# Patient Record
Sex: FEMALE | Race: ASIAN | NOT HISPANIC OR LATINO | Employment: FULL TIME | URBAN - METROPOLITAN AREA
[De-identification: names, ages, dates, MRNs, and addresses within clinical notes are randomized per-mention and may not be internally consistent; named-entity substitution may affect disease eponyms.]

---

## 2024-07-23 ENCOUNTER — APPOINTMENT (OUTPATIENT)
Dept: LAB | Facility: HOSPITAL | Age: 39
End: 2024-07-23
Attending: OBSTETRICS & GYNECOLOGY
Payer: COMMERCIAL

## 2024-07-23 ENCOUNTER — HOSPITAL ENCOUNTER (OUTPATIENT)
Dept: RADIOLOGY | Facility: HOSPITAL | Age: 39
Discharge: HOME | End: 2024-07-23
Attending: OBSTETRICS & GYNECOLOGY
Payer: COMMERCIAL

## 2024-07-23 DIAGNOSIS — Z31.41 FERTILITY TESTING: ICD-10-CM

## 2024-07-23 LAB — HCG UR QL: NEGATIVE

## 2024-07-23 PROCEDURE — 58340 CATHETER FOR HYSTEROGRAPHY: CPT

## 2024-07-23 PROCEDURE — 81025 URINE PREGNANCY TEST: CPT

## 2024-09-14 ENCOUNTER — HEALTH MAINTENANCE LETTER (OUTPATIENT)
Age: 39
End: 2024-09-14

## 2024-10-10 ENCOUNTER — OFFICE VISIT (OUTPATIENT)
Dept: INTERNAL MEDICINE | Facility: CLINIC | Age: 39
End: 2024-10-10
Payer: COMMERCIAL

## 2024-10-10 VITALS
BODY MASS INDEX: 25.16 KG/M2 | WEIGHT: 136.7 LBS | HEIGHT: 62 IN | HEART RATE: 72 BPM | DIASTOLIC BLOOD PRESSURE: 60 MMHG | SYSTOLIC BLOOD PRESSURE: 100 MMHG | RESPIRATION RATE: 16 BRPM | OXYGEN SATURATION: 99 % | TEMPERATURE: 98 F

## 2024-10-10 DIAGNOSIS — E05.00 GRAVES DISEASE: ICD-10-CM

## 2024-10-10 DIAGNOSIS — D25.9 UTERINE LEIOMYOMA, UNSPECIFIED LOCATION: ICD-10-CM

## 2024-10-10 DIAGNOSIS — Z01.818 PREOPERATIVE EXAMINATION: Primary | ICD-10-CM

## 2024-10-10 LAB — HGB BLD-MCNC: 10.7 G/DL (ref 11.7–15.7)

## 2024-10-10 PROCEDURE — 36415 COLL VENOUS BLD VENIPUNCTURE: CPT | Performed by: NURSE PRACTITIONER

## 2024-10-10 PROCEDURE — 99204 OFFICE O/P NEW MOD 45 MIN: CPT | Performed by: NURSE PRACTITIONER

## 2024-10-10 PROCEDURE — G2211 COMPLEX E/M VISIT ADD ON: HCPCS | Performed by: NURSE PRACTITIONER

## 2024-10-10 PROCEDURE — 85018 HEMOGLOBIN: CPT | Performed by: NURSE PRACTITIONER

## 2024-10-10 RX ORDER — METHIMAZOLE 10 MG/1
10 TABLET ORAL DAILY
COMMUNITY
Start: 2024-04-11

## 2024-10-10 NOTE — PROGRESS NOTES
Preoperative Evaluation  [facility]  [address] [address]  Phone: [phone]  Fax: [phone]  Primary Provider: Physician No Ref-Primary  Pre-op Performing Provider: Quirino Mckeon CNP  Oct 10, 2024             10/10/2024   Surgical Information   What procedure is being done? abodminal myomectomy bilateral salpingectomy   Facility or Hospital where procedure/surgery will be performed: [facility]   Who is doing the procedure / surgery? Dr Richardson   Date of surgery / procedure: Oct 22   Time of surgery / procedure:  11am   Where do you plan to recover after surgery? at home with family        Fax number for surgical facility: Note does not need to be faxed, will be available electronically in Epic.    Assessment & Plan     The proposed surgical procedure is considered LOW risk.    Preoperative examination  No contraindications for planned procedure  - Hemoglobin; Future  - Hemoglobin    Uterine leiomyoma, unspecified location  Myomectomy planned for definitive treatment    Graves disease  Continues on methimazole, managed by endocrine. Of note, she had a TSH checked this past August at less than 0.10, denies palpitations, arrhythmias, T4 1.5    Patient Instructions   Hold all supplements, aspirin and NSAIDs for 7 days prior to surgery.     Follow your surgeon's direction on when to stop eating and drinking prior to surgery.    Your surgeon will be managing your pain after your surgery.      Remove all jewelry and metal piercings before your surgery.     Remove nail polish from fingers before surgery.    If you use a CPAP machine, bring this with you to surgery.              - No identified additional risk factors other than previously addressed         Recommendation  Approval given to proceed with proposed procedure, without further diagnostic evaluation.    Ledy Collins is a 39 year old, presenting for the following:  Pre-Op Exam           10/10/2024     9:37 AM   Additional Questions   Roomed by JOANNE ALVA   Accompanied by SELF     HPI related to upcoming procedure: As above        10/10/2024   Pre-Op Questionnaire   Have you ever had a heart attack or stroke? No   Have you ever had surgery on your heart or blood vessels, such as a stent placement, a coronary artery bypass, or surgery on an artery in your head, neck, heart, or legs? No   Do you have chest pain with activity? No   Do you have a history of heart failure? No   Do you currently have a cold, bronchitis or symptoms of other infection? No   Do you have a cough, shortness of breath, or wheezing? No   Do you or anyone in your family have previous history of blood clots? No   Do you or does anyone in your family have a serious bleeding problem such as prolonged bleeding following surgeries or cuts? No   Have you ever had problems with anemia or been told to take iron pills? (!) YES    Have you had any abnormal blood loss such as black, tarry or bloody stools, or abnormal vaginal bleeding? No   Have you ever had a blood transfusion? No   Are you willing to have a blood transfusion if it is medically needed before, during, or after your surgery? Yes   Have you or any of your relatives ever had problems with anesthesia? No   Do you have sleep apnea, excessive snoring or daytime drowsiness? No   Do you have any artifical heart valves or other implanted medical devices like a pacemaker, defibrillator, or continuous glucose monitor? No   Do you have artificial joints? No   Are you allergic to latex? No        Health Care Directive  Patient does not have a Health Care Directive or Living Will: Discussed advance care planning with patient; however, patient declined at this time.    Preoperative Review of    reviewed - no record of controlled substances prescribed.          There are no problems to display for this patient.     Past Medical History:   Diagnosis Date    NO ACTIVE PROBLEMS      Past  "Surgical History:   Procedure Laterality Date    DENTAL SURGERY  age 17    1 wisdom tooth     Current Outpatient Medications   Medication Sig Dispense Refill    methimazole (TAPAZOLE) 10 MG tablet       ibuprofen (MIDOL) 200 MG capsule Take 200 mg by mouth every 4 hours as needed.         Allergies   Allergen Reactions    No Known Drug Allergy         Social History     Tobacco Use    Smoking status: Never     Passive exposure: Never    Smokeless tobacco: Not on file   Substance Use Topics    Alcohol use: No       History   Drug Use No               Objective    /60 (BP Location: Right arm, Patient Position: Sitting, Cuff Size: Adult Regular)   Pulse 72   Temp 98  F (36.7  C)   Resp 16   Ht 1.563 m (5' 1.54\")   Wt 62 kg (136 lb 11.2 oz)   LMP 10/06/2024   SpO2 99%   BMI 25.38 kg/m     Estimated body mass index is 25.38 kg/m  as calculated from the following:    Height as of this encounter: 1.563 m (5' 1.54\").    Weight as of this encounter: 62 kg (136 lb 11.2 oz).  Physical Exam  GENERAL: alert and no distress  NECK: no adenopathy, no asymmetry, masses, or scars  RESP: lungs clear to auscultation - no rales, rhonchi or wheezes  CV: regular rate and rhythm, normal S1 S2, no S3 or S4, no murmur, click or rub, no peripheral edema  ABDOMEN: soft, nontender, no hepatosplenomegaly, no masses and bowel sounds normal  MS: no gross musculoskeletal defects noted, no edema    No results for input(s): \"HGB\", \"PLT\", \"INR\", \"NA\", \"POTASSIUM\", \"CR\", \"A1C\" in the last 8760 hours.     Diagnostics  Labs pending at this time.  Results will be reviewed when available.   No EKG required, no history of coronary heart disease, significant arrhythmia, peripheral arterial disease or other structural heart disease.    Revised Cardiac Risk Index (RCRI)  The patient has the following serious cardiovascular risks for perioperative complications:   - No serious cardiac risks = 0 points     RCRI Interpretation: 1 point: Class II " (low risk - 0.9% complication rate)         Signed Electronically by: Quirino Mckeon CNP  A copy of this evaluation report is provided to the requesting physician.

## 2024-10-10 NOTE — H&P (VIEW-ONLY)
Preoperative Evaluation  [facility]  [address] [address]  Phone: [phone]  Fax: [phone]  Primary Provider: Physician No Ref-Primary  Pre-op Performing Provider: Quirino Mckeon CNP  Oct 10, 2024             10/10/2024   Surgical Information   What procedure is being done? abodminal myomectomy bilateral salpingectomy   Facility or Hospital where procedure/surgery will be performed: [facility]   Who is doing the procedure / surgery? Dr Richardson   Date of surgery / procedure: Oct 22   Time of surgery / procedure:  11am   Where do you plan to recover after surgery? at home with family        Fax number for surgical facility: Note does not need to be faxed, will be available electronically in Epic.    Assessment & Plan     The proposed surgical procedure is considered LOW risk.    Preoperative examination  No contraindications for planned procedure  - Hemoglobin; Future  - Hemoglobin    Uterine leiomyoma, unspecified location  Myomectomy planned for definitive treatment    Graves disease  Continues on methimazole, managed by endocrine. Of note, she had a TSH checked this past August at less than 0.10, denies palpitations, arrhythmias, T4 1.5    Patient Instructions   Hold all supplements, aspirin and NSAIDs for 7 days prior to surgery.     Follow your surgeon's direction on when to stop eating and drinking prior to surgery.    Your surgeon will be managing your pain after your surgery.      Remove all jewelry and metal piercings before your surgery.     Remove nail polish from fingers before surgery.    If you use a CPAP machine, bring this with you to surgery.              - No identified additional risk factors other than previously addressed         Recommendation  Approval given to proceed with proposed procedure, without further diagnostic evaluation.    Ledy Collins is a 39 year old, presenting for the following:  Pre-Op Exam           10/10/2024     9:37 AM   Additional Questions   Roomed by JOANNE ALVA   Accompanied by SELF     HPI related to upcoming procedure: As above        10/10/2024   Pre-Op Questionnaire   Have you ever had a heart attack or stroke? No   Have you ever had surgery on your heart or blood vessels, such as a stent placement, a coronary artery bypass, or surgery on an artery in your head, neck, heart, or legs? No   Do you have chest pain with activity? No   Do you have a history of heart failure? No   Do you currently have a cold, bronchitis or symptoms of other infection? No   Do you have a cough, shortness of breath, or wheezing? No   Do you or anyone in your family have previous history of blood clots? No   Do you or does anyone in your family have a serious bleeding problem such as prolonged bleeding following surgeries or cuts? No   Have you ever had problems with anemia or been told to take iron pills? (!) YES    Have you had any abnormal blood loss such as black, tarry or bloody stools, or abnormal vaginal bleeding? No   Have you ever had a blood transfusion? No   Are you willing to have a blood transfusion if it is medically needed before, during, or after your surgery? Yes   Have you or any of your relatives ever had problems with anesthesia? No   Do you have sleep apnea, excessive snoring or daytime drowsiness? No   Do you have any artifical heart valves or other implanted medical devices like a pacemaker, defibrillator, or continuous glucose monitor? No   Do you have artificial joints? No   Are you allergic to latex? No        Health Care Directive  Patient does not have a Health Care Directive or Living Will: Discussed advance care planning with patient; however, patient declined at this time.    Preoperative Review of    reviewed - no record of controlled substances prescribed.          There are no problems to display for this patient.     Past Medical History:   Diagnosis Date    NO ACTIVE PROBLEMS      Past  "Surgical History:   Procedure Laterality Date    DENTAL SURGERY  age 17    1 wisdom tooth     Current Outpatient Medications   Medication Sig Dispense Refill    methimazole (TAPAZOLE) 10 MG tablet       ibuprofen (MIDOL) 200 MG capsule Take 200 mg by mouth every 4 hours as needed.         Allergies   Allergen Reactions    No Known Drug Allergy         Social History     Tobacco Use    Smoking status: Never     Passive exposure: Never    Smokeless tobacco: Not on file   Substance Use Topics    Alcohol use: No       History   Drug Use No               Objective    /60 (BP Location: Right arm, Patient Position: Sitting, Cuff Size: Adult Regular)   Pulse 72   Temp 98  F (36.7  C)   Resp 16   Ht 1.563 m (5' 1.54\")   Wt 62 kg (136 lb 11.2 oz)   LMP 10/06/2024   SpO2 99%   BMI 25.38 kg/m     Estimated body mass index is 25.38 kg/m  as calculated from the following:    Height as of this encounter: 1.563 m (5' 1.54\").    Weight as of this encounter: 62 kg (136 lb 11.2 oz).  Physical Exam  GENERAL: alert and no distress  NECK: no adenopathy, no asymmetry, masses, or scars  RESP: lungs clear to auscultation - no rales, rhonchi or wheezes  CV: regular rate and rhythm, normal S1 S2, no S3 or S4, no murmur, click or rub, no peripheral edema  ABDOMEN: soft, nontender, no hepatosplenomegaly, no masses and bowel sounds normal  MS: no gross musculoskeletal defects noted, no edema    No results for input(s): \"HGB\", \"PLT\", \"INR\", \"NA\", \"POTASSIUM\", \"CR\", \"A1C\" in the last 8760 hours.     Diagnostics  Labs pending at this time.  Results will be reviewed when available.   No EKG required, no history of coronary heart disease, significant arrhythmia, peripheral arterial disease or other structural heart disease.    Revised Cardiac Risk Index (RCRI)  The patient has the following serious cardiovascular risks for perioperative complications:   - No serious cardiac risks = 0 points     RCRI Interpretation: 1 point: Class II " (low risk - 0.9% complication rate)         Signed Electronically by: Quirino Mckeon CNP  A copy of this evaluation report is provided to the requesting physician.

## 2024-10-18 RX ORDER — OXYCODONE HYDROCHLORIDE 5 MG/1
5 TABLET ORAL EVERY 6 HOURS PRN
Status: ON HOLD | COMMUNITY
Start: 2024-10-09 | End: 2024-10-22

## 2024-10-22 ENCOUNTER — HOSPITAL ENCOUNTER (INPATIENT)
Facility: CLINIC | Age: 39
LOS: 1 days | Discharge: HOME | End: 2024-10-23
Attending: OBSTETRICS & GYNECOLOGY | Admitting: OBSTETRICS & GYNECOLOGY
Payer: COMMERCIAL

## 2024-10-22 ENCOUNTER — ANESTHESIA EVENT (OUTPATIENT)
Dept: SURGERY | Facility: CLINIC | Age: 39
End: 2024-10-22
Payer: COMMERCIAL

## 2024-10-22 ENCOUNTER — ANESTHESIA (OUTPATIENT)
Dept: SURGERY | Facility: CLINIC | Age: 39
End: 2024-10-22
Payer: COMMERCIAL

## 2024-10-22 PROBLEM — Z98.890 S/P MYOMECTOMY: Status: ACTIVE | Noted: 2024-10-22

## 2024-10-22 LAB
ABO/RH(D): NORMAL
ANTIBODY SCREEN: NEGATIVE
BASOPHILS # BLD AUTO: 0.1 10E3/UL (ref 0–0.2)
BASOPHILS NFR BLD AUTO: 1 %
EOSINOPHIL # BLD AUTO: 0.3 10E3/UL (ref 0–0.7)
EOSINOPHIL NFR BLD AUTO: 5 %
ERYTHROCYTE [DISTWIDTH] IN BLOOD BY AUTOMATED COUNT: 22.6 % (ref 10–15)
GLUCOSE BLDC GLUCOMTR-MCNC: 96 MG/DL (ref 70–99)
HCG UR QL: NEGATIVE
HCT VFR BLD AUTO: 36.4 % (ref 35–47)
HGB BLD-MCNC: 11.4 G/DL (ref 11.7–15.7)
IMM GRANULOCYTES # BLD: 0 10E3/UL
IMM GRANULOCYTES NFR BLD: 0 %
LYMPHOCYTES # BLD AUTO: 2.5 10E3/UL (ref 0.8–5.3)
LYMPHOCYTES NFR BLD AUTO: 35 %
MCH RBC QN AUTO: 23.1 PG (ref 26.5–33)
MCHC RBC AUTO-ENTMCNC: 31.3 G/DL (ref 31.5–36.5)
MCV RBC AUTO: 74 FL (ref 78–100)
MONOCYTES # BLD AUTO: 0.4 10E3/UL (ref 0–1.3)
MONOCYTES NFR BLD AUTO: 6 %
NEUTROPHILS # BLD AUTO: 3.8 10E3/UL (ref 1.6–8.3)
NEUTROPHILS NFR BLD AUTO: 54 %
NRBC # BLD AUTO: 0 10E3/UL
NRBC BLD AUTO-RTO: 0 /100
PLATELET # BLD AUTO: 298 10E3/UL (ref 150–450)
RBC # BLD AUTO: 4.93 10E6/UL (ref 3.8–5.2)
SPECIMEN EXPIRATION DATE: NORMAL
WBC # BLD AUTO: 7.1 10E3/UL (ref 4–11)

## 2024-10-22 PROCEDURE — 258N000003 HC RX IP 258 OP 636: Performed by: OBSTETRICS & GYNECOLOGY

## 2024-10-22 PROCEDURE — 370N000017 HC ANESTHESIA TECHNICAL FEE, PER MIN: Performed by: OBSTETRICS & GYNECOLOGY

## 2024-10-22 PROCEDURE — 258N000003 HC RX IP 258 OP 636: Performed by: NURSE ANESTHETIST, CERTIFIED REGISTERED

## 2024-10-22 PROCEDURE — 250N000011 HC RX IP 250 OP 636: Mod: JZ | Performed by: OBSTETRICS & GYNECOLOGY

## 2024-10-22 PROCEDURE — 250N000011 HC RX IP 250 OP 636: Performed by: STUDENT IN AN ORGANIZED HEALTH CARE EDUCATION/TRAINING PROGRAM

## 2024-10-22 PROCEDURE — 250N000011 HC RX IP 250 OP 636: Performed by: OBSTETRICS & GYNECOLOGY

## 2024-10-22 PROCEDURE — 0UT70ZZ RESECTION OF BILATERAL FALLOPIAN TUBES, OPEN APPROACH: ICD-10-PCS | Performed by: OBSTETRICS & GYNECOLOGY

## 2024-10-22 PROCEDURE — 360N000077 HC SURGERY LEVEL 4, PER MIN: Performed by: OBSTETRICS & GYNECOLOGY

## 2024-10-22 PROCEDURE — 710N000010 HC RECOVERY PHASE 1, LEVEL 2, PER MIN: Performed by: OBSTETRICS & GYNECOLOGY

## 2024-10-22 PROCEDURE — 85004 AUTOMATED DIFF WBC COUNT: CPT | Performed by: OBSTETRICS & GYNECOLOGY

## 2024-10-22 PROCEDURE — 250N000013 HC RX MED GY IP 250 OP 250 PS 637: Performed by: STUDENT IN AN ORGANIZED HEALTH CARE EDUCATION/TRAINING PROGRAM

## 2024-10-22 PROCEDURE — 81025 URINE PREGNANCY TEST: CPT | Performed by: OBSTETRICS & GYNECOLOGY

## 2024-10-22 PROCEDURE — C9290 INJ, BUPIVACAINE LIPOSOME: HCPCS | Performed by: STUDENT IN AN ORGANIZED HEALTH CARE EDUCATION/TRAINING PROGRAM

## 2024-10-22 PROCEDURE — 120N000001 HC R&B MED SURG/OB

## 2024-10-22 PROCEDURE — 0UB90ZZ EXCISION OF UTERUS, OPEN APPROACH: ICD-10-PCS | Performed by: OBSTETRICS & GYNECOLOGY

## 2024-10-22 PROCEDURE — 250N000009 HC RX 250: Performed by: OBSTETRICS & GYNECOLOGY

## 2024-10-22 PROCEDURE — 272N000001 HC OR GENERAL SUPPLY STERILE: Performed by: OBSTETRICS & GYNECOLOGY

## 2024-10-22 PROCEDURE — 36415 COLL VENOUS BLD VENIPUNCTURE: CPT | Performed by: OBSTETRICS & GYNECOLOGY

## 2024-10-22 PROCEDURE — 88305 TISSUE EXAM BY PATHOLOGIST: CPT | Mod: TC | Performed by: OBSTETRICS & GYNECOLOGY

## 2024-10-22 PROCEDURE — 86901 BLOOD TYPING SEROLOGIC RH(D): CPT | Performed by: OBSTETRICS & GYNECOLOGY

## 2024-10-22 PROCEDURE — 88305 TISSUE EXAM BY PATHOLOGIST: CPT | Mod: 26 | Performed by: PATHOLOGY

## 2024-10-22 PROCEDURE — 250N000011 HC RX IP 250 OP 636: Performed by: NURSE ANESTHETIST, CERTIFIED REGISTERED

## 2024-10-22 PROCEDURE — 999N000141 HC STATISTIC PRE-PROCEDURE NURSING ASSESSMENT: Performed by: OBSTETRICS & GYNECOLOGY

## 2024-10-22 PROCEDURE — 250N000009 HC RX 250: Performed by: NURSE ANESTHETIST, CERTIFIED REGISTERED

## 2024-10-22 PROCEDURE — 86900 BLOOD TYPING SEROLOGIC ABO: CPT | Performed by: OBSTETRICS & GYNECOLOGY

## 2024-10-22 RX ORDER — TRANEXAMIC ACID 10 MG/ML
1 INJECTION, SOLUTION INTRAVENOUS ONCE
Status: COMPLETED | OUTPATIENT
Start: 2024-10-22 | End: 2024-10-22

## 2024-10-22 RX ORDER — SODIUM PHOSPHATE,MONO-DIBASIC 19G-7G/118
1 ENEMA (ML) RECTAL
Status: DISCONTINUED | OUTPATIENT
Start: 2024-10-22 | End: 2024-10-23 | Stop reason: HOSPADM

## 2024-10-22 RX ORDER — ACETAMINOPHEN 325 MG/1
975 TABLET ORAL ONCE
Status: COMPLETED | OUTPATIENT
Start: 2024-10-22 | End: 2024-10-22

## 2024-10-22 RX ORDER — PROCHLORPERAZINE MALEATE 10 MG
10 TABLET ORAL EVERY 6 HOURS PRN
Status: DISCONTINUED | OUTPATIENT
Start: 2024-10-22 | End: 2024-10-23 | Stop reason: HOSPADM

## 2024-10-22 RX ORDER — PROPOFOL 10 MG/ML
INJECTION, EMULSION INTRAVENOUS PRN
Status: DISCONTINUED | OUTPATIENT
Start: 2024-10-22 | End: 2024-10-22

## 2024-10-22 RX ORDER — IBUPROFEN 800 MG/1
800 TABLET, FILM COATED ORAL EVERY 6 HOURS
Status: DISCONTINUED | OUTPATIENT
Start: 2024-10-22 | End: 2024-10-23 | Stop reason: HOSPADM

## 2024-10-22 RX ORDER — KETOROLAC TROMETHAMINE 15 MG/ML
15 INJECTION, SOLUTION INTRAMUSCULAR; INTRAVENOUS EVERY 6 HOURS
Status: DISCONTINUED | OUTPATIENT
Start: 2024-10-22 | End: 2024-10-23 | Stop reason: HOSPADM

## 2024-10-22 RX ORDER — KETOROLAC TROMETHAMINE 30 MG/ML
INJECTION, SOLUTION INTRAMUSCULAR; INTRAVENOUS PRN
Status: DISCONTINUED | OUTPATIENT
Start: 2024-10-22 | End: 2024-10-22

## 2024-10-22 RX ORDER — HYDROXYZINE HYDROCHLORIDE 25 MG/1
25 TABLET, FILM COATED ORAL EVERY 6 HOURS PRN
Status: DISCONTINUED | OUTPATIENT
Start: 2024-10-22 | End: 2024-10-23 | Stop reason: HOSPADM

## 2024-10-22 RX ORDER — HYDROMORPHONE HCL IN WATER/PF 6 MG/30 ML
0.2 PATIENT CONTROLLED ANALGESIA SYRINGE INTRAVENOUS
Status: DISCONTINUED | OUTPATIENT
Start: 2024-10-22 | End: 2024-10-23 | Stop reason: HOSPADM

## 2024-10-22 RX ORDER — LIDOCAINE 40 MG/G
CREAM TOPICAL
Status: DISCONTINUED | OUTPATIENT
Start: 2024-10-22 | End: 2024-10-23 | Stop reason: HOSPADM

## 2024-10-22 RX ORDER — FENTANYL CITRATE 50 UG/ML
100 INJECTION, SOLUTION INTRAMUSCULAR; INTRAVENOUS ONCE
Status: DISCONTINUED | OUTPATIENT
Start: 2024-10-22 | End: 2024-10-22 | Stop reason: HOSPADM

## 2024-10-22 RX ORDER — AMOXICILLIN 250 MG
1 CAPSULE ORAL 2 TIMES DAILY
Status: DISCONTINUED | OUTPATIENT
Start: 2024-10-22 | End: 2024-10-23 | Stop reason: HOSPADM

## 2024-10-22 RX ORDER — ONDANSETRON 4 MG/1
4 TABLET, ORALLY DISINTEGRATING ORAL EVERY 6 HOURS PRN
Status: DISCONTINUED | OUTPATIENT
Start: 2024-10-22 | End: 2024-10-23 | Stop reason: HOSPADM

## 2024-10-22 RX ORDER — FENTANYL CITRATE 50 UG/ML
50 INJECTION, SOLUTION INTRAMUSCULAR; INTRAVENOUS EVERY 5 MIN PRN
Status: DISCONTINUED | OUTPATIENT
Start: 2024-10-22 | End: 2024-10-22 | Stop reason: HOSPADM

## 2024-10-22 RX ORDER — METHIMAZOLE 5 MG/1
10 TABLET ORAL DAILY
Status: DISCONTINUED | OUTPATIENT
Start: 2024-10-23 | End: 2024-10-23 | Stop reason: HOSPADM

## 2024-10-22 RX ORDER — NALOXONE HYDROCHLORIDE 0.4 MG/ML
0.4 INJECTION, SOLUTION INTRAMUSCULAR; INTRAVENOUS; SUBCUTANEOUS
Status: DISCONTINUED | OUTPATIENT
Start: 2024-10-22 | End: 2024-10-23 | Stop reason: HOSPADM

## 2024-10-22 RX ORDER — SODIUM CHLORIDE, SODIUM LACTATE, POTASSIUM CHLORIDE, CALCIUM CHLORIDE 600; 310; 30; 20 MG/100ML; MG/100ML; MG/100ML; MG/100ML
INJECTION, SOLUTION INTRAVENOUS CONTINUOUS PRN
Status: DISCONTINUED | OUTPATIENT
Start: 2024-10-22 | End: 2024-10-22

## 2024-10-22 RX ORDER — OXYCODONE HYDROCHLORIDE 5 MG/1
5 TABLET ORAL EVERY 4 HOURS PRN
Status: DISCONTINUED | OUTPATIENT
Start: 2024-10-22 | End: 2024-10-23 | Stop reason: HOSPADM

## 2024-10-22 RX ORDER — HYDROMORPHONE HCL IN WATER/PF 6 MG/30 ML
0.4 PATIENT CONTROLLED ANALGESIA SYRINGE INTRAVENOUS
Status: DISCONTINUED | OUTPATIENT
Start: 2024-10-22 | End: 2024-10-23 | Stop reason: HOSPADM

## 2024-10-22 RX ORDER — MAGNESIUM HYDROXIDE 1200 MG/15ML
LIQUID ORAL PRN
Status: DISCONTINUED | OUTPATIENT
Start: 2024-10-22 | End: 2024-10-22 | Stop reason: HOSPADM

## 2024-10-22 RX ORDER — DEXAMETHASONE SODIUM PHOSPHATE 4 MG/ML
4 INJECTION, SOLUTION INTRA-ARTICULAR; INTRALESIONAL; INTRAMUSCULAR; INTRAVENOUS; SOFT TISSUE
Status: DISCONTINUED | OUTPATIENT
Start: 2024-10-22 | End: 2024-10-22 | Stop reason: HOSPADM

## 2024-10-22 RX ORDER — ONDANSETRON 4 MG/1
4 TABLET, ORALLY DISINTEGRATING ORAL EVERY 30 MIN PRN
Status: DISCONTINUED | OUTPATIENT
Start: 2024-10-22 | End: 2024-10-22 | Stop reason: HOSPADM

## 2024-10-22 RX ORDER — HYDROMORPHONE HCL IN WATER/PF 6 MG/30 ML
0.4 PATIENT CONTROLLED ANALGESIA SYRINGE INTRAVENOUS EVERY 5 MIN PRN
Status: DISCONTINUED | OUTPATIENT
Start: 2024-10-22 | End: 2024-10-22 | Stop reason: HOSPADM

## 2024-10-22 RX ORDER — ONDANSETRON 2 MG/ML
4 INJECTION INTRAMUSCULAR; INTRAVENOUS EVERY 30 MIN PRN
Status: DISCONTINUED | OUTPATIENT
Start: 2024-10-22 | End: 2024-10-22 | Stop reason: HOSPADM

## 2024-10-22 RX ORDER — LIDOCAINE 40 MG/G
CREAM TOPICAL
Status: DISCONTINUED | OUTPATIENT
Start: 2024-10-22 | End: 2024-10-22 | Stop reason: HOSPADM

## 2024-10-22 RX ORDER — ONDANSETRON 2 MG/ML
4 INJECTION INTRAMUSCULAR; INTRAVENOUS EVERY 6 HOURS PRN
Status: DISCONTINUED | OUTPATIENT
Start: 2024-10-22 | End: 2024-10-23 | Stop reason: HOSPADM

## 2024-10-22 RX ORDER — LORAZEPAM 2 MG/ML
.5-1 INJECTION INTRAMUSCULAR
Status: DISCONTINUED | OUTPATIENT
Start: 2024-10-22 | End: 2024-10-22 | Stop reason: HOSPADM

## 2024-10-22 RX ORDER — ONDANSETRON 2 MG/ML
INJECTION INTRAMUSCULAR; INTRAVENOUS PRN
Status: DISCONTINUED | OUTPATIENT
Start: 2024-10-22 | End: 2024-10-22

## 2024-10-22 RX ORDER — CEFAZOLIN SODIUM/WATER 2 G/20 ML
2 SYRINGE (ML) INTRAVENOUS SEE ADMIN INSTRUCTIONS
Status: DISCONTINUED | OUTPATIENT
Start: 2024-10-22 | End: 2024-10-22 | Stop reason: HOSPADM

## 2024-10-22 RX ORDER — SODIUM CHLORIDE, SODIUM LACTATE, POTASSIUM CHLORIDE, CALCIUM CHLORIDE 600; 310; 30; 20 MG/100ML; MG/100ML; MG/100ML; MG/100ML
INJECTION, SOLUTION INTRAVENOUS CONTINUOUS
Status: DISCONTINUED | OUTPATIENT
Start: 2024-10-22 | End: 2024-10-23 | Stop reason: HOSPADM

## 2024-10-22 RX ORDER — FENTANYL CITRATE 50 UG/ML
25 INJECTION, SOLUTION INTRAMUSCULAR; INTRAVENOUS EVERY 5 MIN PRN
Status: DISCONTINUED | OUTPATIENT
Start: 2024-10-22 | End: 2024-10-22 | Stop reason: HOSPADM

## 2024-10-22 RX ORDER — LIDOCAINE HYDROCHLORIDE 10 MG/ML
INJECTION, SOLUTION INFILTRATION; PERINEURAL PRN
Status: DISCONTINUED | OUTPATIENT
Start: 2024-10-22 | End: 2024-10-22

## 2024-10-22 RX ORDER — BISACODYL 10 MG
10 SUPPOSITORY, RECTAL RECTAL DAILY PRN
Status: DISCONTINUED | OUTPATIENT
Start: 2024-10-22 | End: 2024-10-23 | Stop reason: HOSPADM

## 2024-10-22 RX ORDER — POLYETHYLENE GLYCOL 3350 17 G/17G
17 POWDER, FOR SOLUTION ORAL DAILY PRN
Status: DISCONTINUED | OUTPATIENT
Start: 2024-10-23 | End: 2024-10-23 | Stop reason: HOSPADM

## 2024-10-22 RX ORDER — VASOPRESSIN 20 U/ML
INJECTION PARENTERAL PRN
Status: DISCONTINUED | OUTPATIENT
Start: 2024-10-22 | End: 2024-10-22 | Stop reason: HOSPADM

## 2024-10-22 RX ORDER — ACETAMINOPHEN 325 MG/1
650 TABLET ORAL EVERY 6 HOURS
Status: DISCONTINUED | OUTPATIENT
Start: 2024-10-25 | End: 2024-10-23 | Stop reason: HOSPADM

## 2024-10-22 RX ORDER — PROPOFOL 10 MG/ML
INJECTION, EMULSION INTRAVENOUS CONTINUOUS PRN
Status: DISCONTINUED | OUTPATIENT
Start: 2024-10-22 | End: 2024-10-22

## 2024-10-22 RX ORDER — NALOXONE HYDROCHLORIDE 0.4 MG/ML
0.2 INJECTION, SOLUTION INTRAMUSCULAR; INTRAVENOUS; SUBCUTANEOUS
Status: DISCONTINUED | OUTPATIENT
Start: 2024-10-22 | End: 2024-10-23 | Stop reason: HOSPADM

## 2024-10-22 RX ORDER — OXYCODONE HYDROCHLORIDE 5 MG/1
10 TABLET ORAL EVERY 4 HOURS PRN
Status: DISCONTINUED | OUTPATIENT
Start: 2024-10-22 | End: 2024-10-23 | Stop reason: HOSPADM

## 2024-10-22 RX ORDER — CEFAZOLIN SODIUM/WATER 2 G/20 ML
2 SYRINGE (ML) INTRAVENOUS
Status: COMPLETED | OUTPATIENT
Start: 2024-10-22 | End: 2024-10-22

## 2024-10-22 RX ORDER — ACETAMINOPHEN 325 MG/1
975 TABLET ORAL ONCE
Status: DISCONTINUED | OUTPATIENT
Start: 2024-10-22 | End: 2024-10-22 | Stop reason: HOSPADM

## 2024-10-22 RX ORDER — ACETAMINOPHEN 325 MG/1
975 TABLET ORAL EVERY 6 HOURS
Status: DISCONTINUED | OUTPATIENT
Start: 2024-10-22 | End: 2024-10-23 | Stop reason: HOSPADM

## 2024-10-22 RX ORDER — DEXAMETHASONE SODIUM PHOSPHATE 10 MG/ML
INJECTION, SOLUTION INTRAMUSCULAR; INTRAVENOUS PRN
Status: DISCONTINUED | OUTPATIENT
Start: 2024-10-22 | End: 2024-10-22

## 2024-10-22 RX ORDER — HYDROMORPHONE HCL IN WATER/PF 6 MG/30 ML
0.2 PATIENT CONTROLLED ANALGESIA SYRINGE INTRAVENOUS EVERY 5 MIN PRN
Status: DISCONTINUED | OUTPATIENT
Start: 2024-10-22 | End: 2024-10-22 | Stop reason: HOSPADM

## 2024-10-22 RX ORDER — BUPIVACAINE HYDROCHLORIDE 2.5 MG/ML
INJECTION, SOLUTION EPIDURAL; INFILTRATION; INTRACAUDAL
Status: DISCONTINUED | OUTPATIENT
Start: 2024-10-22 | End: 2024-10-22

## 2024-10-22 RX ORDER — NALOXONE HYDROCHLORIDE 0.4 MG/ML
0.1 INJECTION, SOLUTION INTRAMUSCULAR; INTRAVENOUS; SUBCUTANEOUS
Status: DISCONTINUED | OUTPATIENT
Start: 2024-10-22 | End: 2024-10-22 | Stop reason: HOSPADM

## 2024-10-22 RX ORDER — FENTANYL CITRATE 50 UG/ML
INJECTION, SOLUTION INTRAMUSCULAR; INTRAVENOUS PRN
Status: DISCONTINUED | OUTPATIENT
Start: 2024-10-22 | End: 2024-10-22

## 2024-10-22 RX ORDER — CALCIUM CARBONATE 500 MG/1
500 TABLET, CHEWABLE ORAL 4 TIMES DAILY PRN
Status: DISCONTINUED | OUTPATIENT
Start: 2024-10-22 | End: 2024-10-23 | Stop reason: HOSPADM

## 2024-10-22 RX ADMIN — HYDROMORPHONE HYDROCHLORIDE 0.5 MG: 1 INJECTION, SOLUTION INTRAMUSCULAR; INTRAVENOUS; SUBCUTANEOUS at 14:05

## 2024-10-22 RX ADMIN — FENTANYL CITRATE 50 MCG: 50 INJECTION INTRAMUSCULAR; INTRAVENOUS at 13:56

## 2024-10-22 RX ADMIN — LIDOCAINE HYDROCHLORIDE 5 ML: 10 INJECTION, SOLUTION INFILTRATION; PERINEURAL at 13:38

## 2024-10-22 RX ADMIN — SODIUM CHLORIDE, POTASSIUM CHLORIDE, SODIUM LACTATE AND CALCIUM CHLORIDE: 600; 310; 30; 20 INJECTION, SOLUTION INTRAVENOUS at 13:32

## 2024-10-22 RX ADMIN — TRANEXAMIC ACID 1 G: 1 INJECTION, SOLUTION INTRAVENOUS at 14:01

## 2024-10-22 RX ADMIN — ONDANSETRON 4 MG: 2 INJECTION INTRAMUSCULAR; INTRAVENOUS at 19:14

## 2024-10-22 RX ADMIN — PROPOFOL 200 MCG/KG/MIN: 10 INJECTION, EMULSION INTRAVENOUS at 13:38

## 2024-10-22 RX ADMIN — ACETAMINOPHEN 975 MG: 325 TABLET ORAL at 12:07

## 2024-10-22 RX ADMIN — TRANEXAMIC ACID 1 G: 10 INJECTION, SOLUTION INTRAVENOUS at 20:19

## 2024-10-22 RX ADMIN — KETOROLAC TROMETHAMINE 15 MG: 15 INJECTION, SOLUTION INTRAMUSCULAR; INTRAVENOUS at 22:03

## 2024-10-22 RX ADMIN — HYDROMORPHONE HYDROCHLORIDE 0.5 MG: 1 INJECTION, SOLUTION INTRAMUSCULAR; INTRAVENOUS; SUBCUTANEOUS at 14:27

## 2024-10-22 RX ADMIN — BUPIVACAINE 20 ML: 13.3 INJECTION, SUSPENSION, LIPOSOMAL INFILTRATION at 15:44

## 2024-10-22 RX ADMIN — KETOROLAC TROMETHAMINE 15 MG: 30 INJECTION, SOLUTION INTRAMUSCULAR at 15:37

## 2024-10-22 RX ADMIN — PROPOFOL 130 MG: 10 INJECTION, EMULSION INTRAVENOUS at 13:38

## 2024-10-22 RX ADMIN — MIDAZOLAM 2 MG: 1 INJECTION INTRAMUSCULAR; INTRAVENOUS at 13:32

## 2024-10-22 RX ADMIN — FENTANYL CITRATE 50 MCG: 50 INJECTION INTRAMUSCULAR; INTRAVENOUS at 13:35

## 2024-10-22 RX ADMIN — ONDANSETRON 4 MG: 2 INJECTION INTRAMUSCULAR; INTRAVENOUS at 15:08

## 2024-10-22 RX ADMIN — DEXAMETHASONE SODIUM PHOSPHATE 10 MG: 10 INJECTION, SOLUTION INTRAMUSCULAR; INTRAVENOUS at 13:50

## 2024-10-22 RX ADMIN — FENTANYL CITRATE 50 MCG: 50 INJECTION INTRAMUSCULAR; INTRAVENOUS at 15:48

## 2024-10-22 RX ADMIN — ROCURONIUM BROMIDE 60 MG: 10 INJECTION, SOLUTION INTRAVENOUS at 13:38

## 2024-10-22 RX ADMIN — SUGAMMADEX 200 MG: 100 INJECTION, SOLUTION INTRAVENOUS at 15:46

## 2024-10-22 RX ADMIN — BUPIVACAINE HYDROCHLORIDE 40 ML: 2.5 INJECTION, SOLUTION EPIDURAL; INFILTRATION; INTRACAUDAL at 15:44

## 2024-10-22 RX ADMIN — ROCURONIUM BROMIDE 20 MG: 10 INJECTION, SOLUTION INTRAVENOUS at 15:00

## 2024-10-22 RX ADMIN — SODIUM CHLORIDE, POTASSIUM CHLORIDE, SODIUM LACTATE AND CALCIUM CHLORIDE: 600; 310; 30; 20 INJECTION, SOLUTION INTRAVENOUS at 18:04

## 2024-10-22 RX ADMIN — FENTANYL CITRATE 25 MCG: 50 INJECTION INTRAMUSCULAR; INTRAVENOUS at 16:31

## 2024-10-22 RX ADMIN — Medication 2 G: at 13:32

## 2024-10-22 RX ADMIN — ROCURONIUM BROMIDE 20 MG: 10 INJECTION, SOLUTION INTRAVENOUS at 14:27

## 2024-10-22 RX ADMIN — FENTANYL CITRATE 50 MCG: 50 INJECTION INTRAMUSCULAR; INTRAVENOUS at 15:08

## 2024-10-22 RX ADMIN — FENTANYL CITRATE 25 MCG: 50 INJECTION INTRAMUSCULAR; INTRAVENOUS at 16:26

## 2024-10-22 ASSESSMENT — ACTIVITIES OF DAILY LIVING (ADL)
ADLS_ACUITY_SCORE: 29
ADLS_ACUITY_SCORE: 29
ADLS_ACUITY_SCORE: 30
ADLS_ACUITY_SCORE: 29
ADLS_ACUITY_SCORE: 29
ADLS_ACUITY_SCORE: 30
ADLS_ACUITY_SCORE: 29
ADLS_ACUITY_SCORE: 30

## 2024-10-22 ASSESSMENT — LIFESTYLE VARIABLES: TOBACCO_USE: 0

## 2024-10-22 NOTE — PHARMACY-ADMISSION MEDICATION HISTORY
Pharmacist Admission Medication History    Admission medication history is complete. The information provided in this note is only as accurate as the sources available at the time of the update.    Information Source(s): Patient and CareEverywhere/SureScripts via in-person    Pertinent Information:      Changes made to PTA medication list:  Added: None  Deleted: None  Changed: None    Allergies reviewed with patient and updates made in EHR: yes    Medication History Completed By: Hamilton Jimenez RPH 10/22/2024 12:09 PM    PTA Med List   Medication Sig Last Dose    methimazole (TAPAZOLE) 10 MG tablet Take 10 mg by mouth daily. 10/20/2024 at am

## 2024-10-22 NOTE — BRIEF OP NOTE
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]  Uterine fibroid [D25.9]  Post-operative diagnosis Same as pre-operative diagnosis    Procedure: ABDOMINAL MYOMECTOMY,, N/A - Abdomen  BILATERAL SALPINGECTOMY, Bilateral - Abdomen    Surgeon: Surgeons and Role:     * Brandi Richardson MD - Primary     * Sharlene Alex MD - Assisting  Anesthesia: General with Block   Estimated Blood Loss: 450 ml     Drains: None  Specimens:   ID Type Source Tests Collected by Time Destination   1 : Right Fallopian Tube Tissue Fallopian Tube, Right SURGICAL PATHOLOGY EXAM Brandi Richardson MD 10/22/2024  3:00 PM    2 : Left Fallopian Tube Tissue Fallopian Tube, Left SURGICAL PATHOLOGY EXAM Brandi Richardson MD 10/22/2024  3:01 PM    3 : Uterine Leiomyoma Tissue Uterus SURGICAL PATHOLOGY EXAM Brandi Richardson MD 10/22/2024  3:02 PM      Findings:   Solitary 7 cm leiomyoma within the posterior wall of the uterus. Extensive dissection of the posterior wall of the uterus, abutting and involving the endometrial cavity. Overall normal appearing uterine tubes, paratubal cyst on left uterine tube. Normal appearing ovaries.   Complications: None.

## 2024-10-22 NOTE — INTERVAL H&P NOTE
"I have reviewed the surgical (or preoperative) H&P that is linked to this encounter, and examined the patient. There are no significant changes    Clinical Conditions Present on Arrival:  Clinically Significant Risk Factors Present on Admission                       # Overweight: Estimated body mass index is 25.38 kg/m  as calculated from the following:    Height as of 10/10/24: 1.563 m (5' 1.54\").    Weight as of 10/10/24: 62 kg (136 lb 11.2 oz).       "

## 2024-10-22 NOTE — OP NOTE
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]  Uterine fibroid [D25.9]  Post-operative diagnosis Same as pre-operative diagnosis    Procedure: ABDOMINAL MYOMECTOMY,, N/A - Abdomen  BILATERAL SALPINGECTOMY, Bilateral - Abdomen    Surgeon: Surgeons and Role:     * Brandi Richardson MD - Primary     * Sharlene Alex MD - Assisting  Anesthesia: General with Block   Estimated Blood Loss: 450 ml   Drains: None    Specimens:   ID Type Source Tests Collected by Time Destination   1 : Right Fallopian Tube Tissue Fallopian Tube, Right SURGICAL PATHOLOGY EXAM Brandi Richardson MD 10/22/2024  3:00 PM    2 : Left Fallopian Tube Tissue Fallopian Tube, Left SURGICAL PATHOLOGY EXAM Brandi Richardson MD 10/22/2024  3:01 PM    3 : Uterine Leiomyoma Tissue Uterus SURGICAL PATHOLOGY EXAM Brandi Richardson MD 10/22/2024  3:02 PM      Findings:   Solitary 7 cm leiomyoma within the posterior wall of the uterus. Extensive dissection of the posterior wall of the uterus, abutting and involving the endometrial cavity. Overall normal appearing uterine tubes, paratubal cyst on left uterine tube. Normal appearing ovaries.   Complications: None.    Indications: 38yo G0 with infertility who was found to have a large uterine leiomyoma on imaging, and is planning to undergo IVF. The reproductive endocrinologist requested she undergo a myomectomy prior to IVF. She also had bilateral blocked uterine tubes, and it was requested that she have these removed prior to IVF as well. Risks of the surgery were discussed. It was also discussed that she will need a  section in a future pregnancy due to the size and location of the fibroid.    Procedure:  She signed her consent in the pre-operative holding area.  She was taken to the operating room and placed in dorsal position.  Patient was prepped and draped in the usual sterile manner. She received  pre-operative antibiotics.  A time out was performed.  A aguirre catheter was then placed with clear yellow urine return.     A Pfanenstial skin incision was made. This was carried down to the fascia using the Bovie electrocautery.  Fascia was incised and dissected off of the rectus muscles. The peritoneum was then identified in the midline and opened.  The bowel was then packed away with wet laparotomy sponges and the Geoff O retractor was placed to visualize the pelvic anatomy.     A posterior fibroid was noted and the uterus was brought up through the rectus muscles.  The top of the myoma was infiltrated with 8 cc of vasopressin diluted in saline.  An incision was made over the top of the myoma and it was carefully shelled out with blunt dissection and sharp dissection with the Metzenbaum and Bovie. There was extensive dissection down the posterior wall, down to the level of the cervix. The endometrium was entered and she will require a  at 36-37 weeks in a pregnancy.        The myometrium was then approximated with 0-vicryl pop offs in an interrupted fashion. And then the uterine serosa was closed with a baseball stitch using 2-0 vicryl. Hemostasis was confirmed with figure of 8 sutures, Surgicel powder, and Surgicel snow.       The left uterine tube was transected and removed with the handheld Ligasure device. This was sent to pathology. Likewise, the right uterine tube was transected and removed with the Ligasure device. This was also sent to pathology.    The packing material was removed. The Geoff O was removed and the fascia was then closed with 0-vicryl.      The muscles were inspected and hemostasis was achieved with the Bovie cautery. The the skin was then closed with 4-0 vicryl on a Kaden needle. Benzoin, steri strips and a Mepilex was placed over the incision.    Sponge, lap and needle counts were correct x 2. The patient tolerated the procedure well and was transferred to the recovery area in  stable condition.

## 2024-10-22 NOTE — ANESTHESIA CARE TRANSFER NOTE
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    Anesthesia Type:   General     Note:    Oropharynx: oropharynx clear of all foreign objects and spontaneously breathing  Level of Consciousness: awake and drowsy  Oxygen Supplementation: face mask  Level of Supplemental Oxygen (L/min / FiO2): 6  Independent Airway: airway patency satisfactory and stable  Dentition: dentition unchanged  Vital Signs Stable: post-procedure vital signs reviewed and stable  Report to RN Given: handoff report given  Patient transferred to: PACU    Handoff Report: Identifed the Patient, Identified the Reponsible Provider, Reviewed the pertinent medical history, Discussed the surgical course, Reviewed Intra-OP anesthesia mangement and issues during anesthesia, Set expectations for post-procedure period and Allowed opportunity for questions and acknowledgement of understanding      Vitals:  Vitals Value Taken Time   /70 10/22/24 1553   Temp 35.9  C (96.62  F) 10/22/24 1555   Pulse 83 10/22/24 1555   Resp 24 10/22/24 1555   SpO2 100 % 10/22/24 1555   Vitals shown include unfiled device data.    Electronically Signed By: MIGUEL Strong CRNA  October 22, 2024  3:57 PM

## 2024-10-22 NOTE — ANESTHESIA PROCEDURE NOTES
Airway       Patient location during procedure: OR       Procedure Start/Stop Times: 10/22/2024 1:43 PM  Staff -        Anesthesiologist:  Goldberg, Leslie, MD       CRNA: Kasie Wilson APRN CRNA       Performed By: CRNA  Consent for Airway        Urgency: elective  Indications and Patient Condition       Indications for airway management: cristy-procedural       Induction type:intravenous       Mask difficulty assessment: 1 - vent by mask    Final Airway Details       Final airway type: endotracheal airway       Successful airway: ETT - single  Endotracheal Airway Details        ETT size (mm): 7.0       Cuffed: yes       Successful intubation technique: direct laryngoscopy       DL Blade Type: Shelton 2       Grade View of Cords: 1       Adjucts: stylet       Position: Right       Measured from: gums/teeth       Secured at (cm): 21       Bite block used: None    Post intubation assessment        Placement verified by: capnometry, equal breath sounds and chest rise        Number of attempts at approach: 1       Secured with: tape       Ease of procedure: easy       Dentition: Intact and Unchanged       Dental guard used and removed. Dental Guard Type: Standard White.    Medication(s) Administered   Medication Administration Time: 10/22/2024 1:43 PM

## 2024-10-22 NOTE — ANESTHESIA PROCEDURE NOTES
TAP Procedure Note    Pre-Procedure   Staff -        Anesthesiologist:  Goldberg, Leslie, MD       Performed By: anesthesiologist       Location: pre-op       Procedure Start/Stop Times: 10/22/2024 3:44 PM and 10/22/2024 3:46 PM       Pre-Anesthestic Checklist: patient identified, IV checked, site marked, risks and benefits discussed, informed consent, monitors and equipment checked, pre-op evaluation, at physician/surgeon's request and post-op pain management  Timeout:       Correct Patient: Yes        Correct Procedure: Yes        Correct Site: Yes        Correct Position: Yes        Correct Laterality: Yes        Site Marked: N/A  Procedure Documentation  Procedure: TAP       Diagnosis: POST-OPERATIVE PAIN CONTROL       Laterality: bilateral       Patient Position: supine       Skin prep: Chloraprep       Needle Type: short bevel       Needle Gauge: 20.        Needle Length (Inches): 4        Ultrasound guided       1. Ultrasound was used to identify targeted nerve, plexus, vascular marker, or fascial plane and place a needle adjacent to it in real-time.       2. Ultrasound was used to visualize the spread of anesthetic in close proximity to the above referenced structure.       3. A permanent image is entered into the patient's record.    Assessment/Narrative         The placement was negative for: blood aspirated, painful injection and site bleeding       Paresthesias: No.       Bolus given via needle. no blood aspirated via catheter.        Secured via.        Insertion/Infusion Method: Single Shot       Complications: none       Injection made incrementally with aspirations every 5 mL.    Medication(s) Administered   Bupivacaine 0.25% PF (Infiltration) - Infiltration   40 mL - 10/22/2024 3:44:00 PM  Bupivacaine liposome (Exparel) 1.3% LA inj susp (Infiltration) - Infiltration   20 mL - 10/22/2024 3:44:00 PM  Medication Administration Time: 10/22/2024 3:44 PM      FOR [facility] ([facility]) ONLY:   Pain Team  "Contact information: please page the Pain Team Via [facility]. Search \"Pain\". During daytime hours, please page the attending first. At night please page the resident first.      "

## 2024-10-22 NOTE — ANESTHESIA POSTPROCEDURE EVALUATION
Patient: Dennis Page    Procedure: Procedure(s):  ABDOMINAL MYOMECTOMY,  BILATERAL SALPINGECTOMY       Anesthesia Type:  General    Note:  Disposition: Outpatient   Postop Pain Control: Uneventful            Sign Out: Well controlled pain   PONV: No   Neuro/Psych: Uneventful            Sign Out: Acceptable/Baseline neuro status   Airway/Respiratory: Uneventful            Sign Out: Acceptable/Baseline resp. status   CV/Hemodynamics: Uneventful            Sign Out: Acceptable CV status; No obvious hypovolemia; No obvious fluid overload   Other NRE: NONE   DID A NON-ROUTINE EVENT OCCUR? No           Last vitals:  Vitals Value Taken Time   /73 10/22/24 1700   Temp 36.1  C (96.98  F) 10/22/24 1704   Pulse 88 10/22/24 1704   Resp 44 10/22/24 1702   SpO2 100 % 10/22/24 1704   Vitals shown include unfiled device data.    Electronically Signed By: Norman Goldman MD  October 22, 2024  5:20 PM

## 2024-10-22 NOTE — ANESTHESIA PREPROCEDURE EVALUATION
Anesthesia Pre-Procedure Evaluation    Patient: Dennis Page   MRN: [id number] : 1985        Procedure : Procedure(s):  ABDOMINAL MYOMECTOMY,  BILATERAL SALPINGECTOMY          Past Medical History:   Diagnosis Date    Motion sickness     NO ACTIVE PROBLEMS       Past Surgical History:   Procedure Laterality Date    DENTAL SURGERY  age 17    1 wisdom tooth      Allergies   Allergen Reactions    No Known Drug Allergy       Social History     Tobacco Use    Smoking status: Never     Passive exposure: Never    Smokeless tobacco: Not on file   Substance Use Topics    Alcohol use: No      Wt Readings from Last 1 Encounters:   10/10/24 62 kg (136 lb 11.2 oz)        Anesthesia Evaluation   Pt has not had prior anesthetic         ROS/MED HX  ENT/Pulmonary:  - neg pulmonary ROS  (-) tobacco use   Neurologic:  - neg neurologic ROS     Cardiovascular:  - neg cardiovascular ROS     METS/Exercise Tolerance: >4 METS    Hematologic:     (+)      anemia,          Musculoskeletal:  - neg musculoskeletal ROS     GI/Hepatic:  - neg GI/hepatic ROS  (-) GERD   Renal/Genitourinary:  - neg Renal ROS     Endo:  - neg endo ROS   (+)          thyroid problem, hyperthyroidism,        (-) obesity   Psychiatric/Substance Use:  - neg psychiatric ROS     Infectious Disease:  - neg infectious disease ROS     Malignancy:       Other:            Physical Exam    Airway        Mallampati: II   TM distance: > 3 FB   Neck ROM: full   Mouth opening: > 3 cm    Respiratory Devices and Support         Dental       (+) Minor Abnormalities - some fillings, tiny chips      Cardiovascular          Rhythm and rate: normal     Pulmonary           breath sounds clear to auscultation           OUTSIDE LABS:  CBC:   Lab Results   Component Value Date    WBC 7.1 10/22/2024    WBC 5.4 2012    HGB 11.4 (L) 10/22/2024    HGB 10.7 (L) 10/10/2024    HCT 36.4 10/22/2024    HCT 39.5 2012     10/22/2024     2012     BMP:   Lab Results  "  Component Value Date     03/22/2012    POTASSIUM 3.8 03/22/2012    CHLORIDE 105 03/22/2012    CO2 22 03/22/2012    BUN 10 03/22/2012    CR 0.56 03/22/2012    GLC 96 10/22/2024    GLC 89 03/22/2012     COAGS: No results found for: \"PTT\", \"INR\", \"FIBR\"  POC:   Lab Results   Component Value Date    HCG Negative 10/22/2024     HEPATIC: No results found for: \"ALBUMIN\", \"PROTTOTAL\", \"ALT\", \"AST\", \"GGT\", \"ALKPHOS\", \"BILITOTAL\", \"BILIDIRECT\", \"PIO\"  OTHER:   Lab Results   Component Value Date    KATERINA 9.3 03/22/2012    TSH 2.03 03/22/2012       Anesthesia Plan    ASA Status:  3    NPO Status:  NPO Appropriate    Anesthesia Type: General.     - Airway: ETT   Induction: Intravenous.   Maintenance: TIVA.        Consents    Anesthesia Plan(s) and associated risks, benefits, and realistic alternatives discussed. Questions answered and patient/representative(s) expressed understanding.     - Discussed: Risks, Benefits and Alternatives for BOTH SEDATION and the PROCEDURE were discussed     - Discussed with:  Patient            Postoperative Care    Pain management: IV analgesics, Oral pain medications, Peripheral nerve block (Single Shot), Multi-modal analgesia.   PONV prophylaxis: Ondansetron (or other 5HT-3), Dexamethasone or Solumedrol, Background Propofol Infusion     Comments:               Leslie Goldberg, MD    I have reviewed the pertinent notes and labs in the chart from the past 30 days and (re)examined the patient.  Any updates or changes from those notes are reflected in this note.                       # Overweight: Estimated body mass index is 25.38 kg/m  as calculated from the following:    Height as of 10/10/24: 1.563 m (5' 1.54\").    Weight as of 10/10/24: 62 kg (136 lb 11.2 oz).             "

## 2024-10-22 NOTE — PLAN OF CARE
Goal Outcome Evaluation:    [id number]     Pt came to the floor around 1715. Her vitals are stable. She is currently on room air sating above 96%. She is still very sleepy, so she has yet to ambulate. Her pain is being managed with oral pain medications. She rates her pain at 5/10. She has a aguirre catheter in place. Her IV is infusing LR at 100 mL/hr. She is on clear liquids. She does not want to eat anything yet.          Pt was nauseous and vomited around 1910. Zofran IV administered. Family at bedside.       Problem: Adult Inpatient Plan of Care  Goal: Optimal Comfort and Wellbeing  Outcome: Progressing     Problem: Pain Acute  Goal: Optimal Pain Control and Function  Outcome: Progressing  Intervention: Prevent or Manage Pain  Recent Flowsheet Documentation  Taken 10/22/2024 1724 by Sabine Reinoso, RN  Complementary Therapy:   aromatherapy utilized   music therapy provided     Problem: Adult Inpatient Plan of Care  Goal: Readiness for Transition of Care  Outcome: Progressing     Problem: Adult Inpatient Plan of Care  Goal: Absence of Hospital-Acquired Illness or Injury  Outcome: Progressing  Intervention: Prevent Skin Injury  Recent Flowsheet Documentation  Taken 10/22/2024 1724 by Sabine Reinoso, RN  Body Position: supine, head elevated  Intervention: Prevent and Manage VTE (Venous Thromboembolism) Risk  Recent Flowsheet Documentation  Taken 10/22/2024 1724 by Sabine Reinoso, RN  VTE Prevention/Management: SCDs on (sequential compression devices)

## 2024-10-23 VITALS
SYSTOLIC BLOOD PRESSURE: 123 MMHG | OXYGEN SATURATION: 100 % | TEMPERATURE: 98.5 F | WEIGHT: 136 LBS | BODY MASS INDEX: 25.25 KG/M2 | RESPIRATION RATE: 17 BRPM | HEART RATE: 81 BPM | DIASTOLIC BLOOD PRESSURE: 77 MMHG

## 2024-10-23 LAB
GLUCOSE SERPL-MCNC: 116 MG/DL (ref 70–99)
HGB BLD-MCNC: 8.7 G/DL (ref 11.7–15.7)

## 2024-10-23 PROCEDURE — 250N000013 HC RX MED GY IP 250 OP 250 PS 637: Performed by: OBSTETRICS & GYNECOLOGY

## 2024-10-23 PROCEDURE — 82947 ASSAY GLUCOSE BLOOD QUANT: CPT | Performed by: OBSTETRICS & GYNECOLOGY

## 2024-10-23 PROCEDURE — 258N000003 HC RX IP 258 OP 636: Performed by: OBSTETRICS & GYNECOLOGY

## 2024-10-23 PROCEDURE — 85018 HEMOGLOBIN: CPT | Performed by: OBSTETRICS & GYNECOLOGY

## 2024-10-23 PROCEDURE — 36415 COLL VENOUS BLD VENIPUNCTURE: CPT | Performed by: OBSTETRICS & GYNECOLOGY

## 2024-10-23 PROCEDURE — 250N000011 HC RX IP 250 OP 636: Performed by: OBSTETRICS & GYNECOLOGY

## 2024-10-23 RX ADMIN — METHIMAZOLE 10 MG: 5 TABLET ORAL at 09:34

## 2024-10-23 RX ADMIN — ACETAMINOPHEN 975 MG: 325 TABLET ORAL at 12:43

## 2024-10-23 RX ADMIN — ONDANSETRON 4 MG: 4 TABLET, ORALLY DISINTEGRATING ORAL at 00:40

## 2024-10-23 RX ADMIN — KETOROLAC TROMETHAMINE 15 MG: 15 INJECTION, SOLUTION INTRAMUSCULAR; INTRAVENOUS at 04:08

## 2024-10-23 RX ADMIN — SENNOSIDES AND DOCUSATE SODIUM 1 TABLET: 50; 8.6 TABLET ORAL at 09:34

## 2024-10-23 RX ADMIN — IBUPROFEN 800 MG: 800 TABLET, FILM COATED ORAL at 09:35

## 2024-10-23 RX ADMIN — SODIUM CHLORIDE, POTASSIUM CHLORIDE, SODIUM LACTATE AND CALCIUM CHLORIDE: 600; 310; 30; 20 INJECTION, SOLUTION INTRAVENOUS at 04:08

## 2024-10-23 ASSESSMENT — ACTIVITIES OF DAILY LIVING (ADL)
ADLS_ACUITY_SCORE: 0
ADLS_ACUITY_SCORE: 19
ADLS_ACUITY_SCORE: 0

## 2024-10-23 NOTE — PROGRESS NOTES
[facility] Gynecology          Assessment and Plan:   Assessment:   Uterine fibroid s/p abdominal myomectomy and bilateral salpingectomy      Plan:   AM Hemoglobin  Encourage ambulation  Pain medications as needed, transition to oral  Monitor for voiding  Anticipate discharge later today or tomorrow            Interval History:   Continues to improve.  Doing well.  Denies shortness of breath, palpitations, nausea or vomiting.  No significant events overnight and no new concerns today.           Significant Problems:   Active Problems:    S/P myomectomy           Review of Systems:   The Review of Systems is negative other than noted in the HPI          Medications:     Current Facility-Administered Medications   Medication Dose Route Frequency Provider Last Rate Last Admin    acetaminophen (TYLENOL) tablet 975 mg  975 mg Oral Q6H Brandi Richardson MD        Followed by    [START ON 10/25/2024] acetaminophen (TYLENOL) tablet 650 mg  650 mg Oral Q6H Brandi Richardson MD        ketorolac (TORADOL) injection 15 mg  15 mg Intravenous Q6H Brandi Richardson MD   15 mg at 10/23/24 0408    Or    ibuprofen (ADVIL/MOTRIN) tablet 800 mg  800 mg Oral Q6H Brandi Richardson MD        methimazole (TAPAZOLE) tablet 10 mg  10 mg Oral Daily Brandi Richardson MD        senna-docusate (SENOKOT-S/PERICOLACE) 8.6-50 MG per tablet 1 tablet  1 tablet Oral BID Brandi Richardson MD        sodium chloride (PF) 0.9% PF flush 3 mL  3 mL Intracatheter Q8H Brandi Richardson MD   3 mL at 10/22/24 1807             Physical Exam:   Vitals were reviewed  Patient Vitals for the past 12 hrs:   BP Temp Temp src Pulse Resp SpO2   10/23/24 0743 123/77 98.5  F (36.9  C) Oral 81 17 100 %   10/23/24 0407 121/74 98.8  F (37.1  C) Oral 91 16 100 %   10/23/24 0105 111/79 97.9  F (36.6  C) Oral 93 18 99 %   10/22/24 2139 126/82 99.3  F (37.4  C) Oral 93 17 100 %     Abdomen:   Incision clean, dry, intact     Constitutional:   awake, alert,  cooperative, no apparent distress, and appears stated age     Neuropsychiatric:   General: normal, calm, and normal eye contact  Level of consciousness: alert / normal  Affect: normal             Data:     Lab Results   Component Value Date    WBC 7.1 10/22/2024    HGB 11.4 (L) 10/22/2024    HCT 36.4 10/22/2024    MCV 74 (L) 10/22/2024     10/22/2024     Lab Results   Component Value Date    HGB 11.4 (L) 10/22/2024     Attestation:  I have reviewed today's vital signs, notes, medications, labs and imaging.     Brandi Richardson MD

## 2024-10-23 NOTE — DISCHARGE SUMMARY
[facility] Discharge Summary    Dennis Page MRN# [id number]   Age: 39 year old YOB: 1985     Date of Admission:  10/22/2024  Date of Discharge::  10/23/2024   Admitting Physician:  Brandi Richardson  Discharge Physician:  Brandi Richardson MD     Home clinic: [facility]          Admission Diagnoses:   Blocked uterine tubes  Uterine fibroid  Infertility          Discharge Diagnosis:     Active Problems:    S/P myomectomy   Anemia from acute blood loss (Hgb 8.7 on POD#1)          Procedures:     Procedure(s):  Abdominal myomectomy, bilateral salpingectomy       No other procedures performed during this admission           Medications Prior to Admission:     Medications Prior to Admission   Medication Sig Dispense Refill Last Dose/Taking    methimazole (TAPAZOLE) 10 MG tablet Take 10 mg by mouth daily.   10/20/2024 at am             Discharge Medications:     Current Discharge Medication List        CONTINUE these medications which have NOT CHANGED    Details   methimazole (TAPAZOLE) 10 MG tablet Take 10 mg by mouth daily.                   Consultations:   No consultations were requested during this admission          Brief History of Illness:   Presented for scheduled surgery           Hospital Course:   The patient's hospital course was unremarkable.  She recovered as anticipated and experienced no post-operative complications.          Discharge Instructions and Follow-Up:     Discharge diet: Regular   Discharge activity: No heavy lifting, pushing, pulling for 6 week(s)  No driving or operating machinery while on narcotic analgesics  Pelvic rest: abstain from intercourse and do not use tampons for 6 week(s)   Discharge follow-up: Follow up with me in 2 and 6 weeks   Wound care Drink plenty of fluids  Ice to area for comfort  Keep wound clean and dry  Remove outer dressing in by POD#4-5  Steri strips off in the office in 2 weeks           Discharge Disposition:     Discharged to home       Attestation:  I have reviewed today's vital signs, notes, medications, labs and imaging.    Brandi Richardson MD

## 2024-10-23 NOTE — PLAN OF CARE
Problem: Adult Inpatient Plan of Care  Goal: Plan of Care Review  Description: The Plan of Care Review/Shift note should be completed every shift.  The Outcome Evaluation is a brief statement about your assessment that the patient is improving, declining, or no change.  This information will be displayed automatically on your shift  note.  Outcome: Progressing     Problem: Adult Inpatient Plan of Care  Goal: Optimal Comfort and Wellbeing  Outcome: Progressing     Problem: Pain Acute  Goal: Optimal Pain Control and Function  Outcome: Progressing   Goal Outcome Evaluation:  Pt vitals stable overnight. Pt was nauseas overnight. Poor Po fluid intake overnight.

## 2024-10-23 NOTE — PLAN OF CARE
Goal Outcome Evaluation:      Pt vitals stable. She is independent in the room. She is on a regular diet. She was able to tolerate two meals this shift. She is voiding spontaneously without difficulty. Her pain has been well managed with oral pain medication this shift. IV removed.      Discharge education given to pt and questions answered. Pt confirms understanding of discharge teaching. Pt discharged home with family.     Problem: Adult Inpatient Plan of Care  Goal: Optimal Comfort and Wellbeing  10/23/2024 1444 by Sabine Reinoso RN  Outcome: Adequate for Care Transition  10/23/2024 0941 by Sabine Reinoso RN  Outcome: Progressing  Intervention: Monitor Pain and Promote Comfort  Recent Flowsheet Documentation  Taken 10/23/2024 0929 by Sabine Reinoso RN  Pain Management Interventions: medication (see MAR)     Problem: Pain Acute  Goal: Optimal Pain Control and Function  10/23/2024 1444 by Sabine Reinoso RN  Outcome: Adequate for Care Transition  10/23/2024 0941 by Sabine Reinoso, RN  Outcome: Progressing  Intervention: Develop Pain Management Plan  Recent Flowsheet Documentation  Taken 10/23/2024 0929 by Sabine Reinoso RN  Pain Management Interventions: medication (see MAR)     Problem: Adult Inpatient Plan of Care  Goal: Readiness for Transition of Care  10/23/2024 1444 by Sabine Reinoso RN  Outcome: Adequate for Care Transition  10/23/2024 0941 by Sabine Reinoso, RN  Outcome: Progressing     Problem: Adult Inpatient Plan of Care  Goal: Absence of Hospital-Acquired Illness or Injury  10/23/2024 1444 by Sabine Reinoso RN  Outcome: Adequate for Care Transition  10/23/2024 0941 by Sabine Reinoso RN  Outcome: Progressing  Intervention: Prevent Skin Injury  Recent Flowsheet Documentation  Taken 10/23/2024 0929 by Sabine Reinoso RN  Body Position: position changed independently

## 2024-10-25 LAB
PATH REPORT.COMMENTS IMP SPEC: NORMAL
PATH REPORT.FINAL DX SPEC: NORMAL
PATH REPORT.GROSS SPEC: NORMAL
PATH REPORT.MICROSCOPIC SPEC OTHER STN: NORMAL
PATH REPORT.RELEVANT HX SPEC: NORMAL
PHOTO IMAGE: NORMAL

## 2025-03-24 ENCOUNTER — OFFICE VISIT (OUTPATIENT)
Dept: FAMILY MEDICINE | Facility: CLINIC | Age: 40
End: 2025-03-24
Payer: COMMERCIAL

## 2025-03-24 VITALS
TEMPERATURE: 98.5 F | HEIGHT: 61 IN | RESPIRATION RATE: 16 BRPM | HEART RATE: 93 BPM | BODY MASS INDEX: 27 KG/M2 | WEIGHT: 143 LBS | DIASTOLIC BLOOD PRESSURE: 70 MMHG | SYSTOLIC BLOOD PRESSURE: 100 MMHG | OXYGEN SATURATION: 98 %

## 2025-03-24 DIAGNOSIS — Z01.818 PREOP GENERAL PHYSICAL EXAM: Primary | ICD-10-CM

## 2025-03-24 DIAGNOSIS — E05.00 GRAVES DISEASE: Chronic | ICD-10-CM

## 2025-03-24 DIAGNOSIS — E05.90 HYPERTHYROIDISM: ICD-10-CM

## 2025-03-24 LAB
HGB BLD-MCNC: 13.9 G/DL (ref 11.7–15.7)
HOLD SPECIMEN: NORMAL

## 2025-03-24 PROCEDURE — 99214 OFFICE O/P EST MOD 30 MIN: CPT | Performed by: NURSE PRACTITIONER

## 2025-03-24 PROCEDURE — 36415 COLL VENOUS BLD VENIPUNCTURE: CPT | Performed by: NURSE PRACTITIONER

## 2025-03-24 PROCEDURE — 3074F SYST BP LT 130 MM HG: CPT | Performed by: NURSE PRACTITIONER

## 2025-03-24 PROCEDURE — 85018 HEMOGLOBIN: CPT | Performed by: NURSE PRACTITIONER

## 2025-03-24 PROCEDURE — 3078F DIAST BP <80 MM HG: CPT | Performed by: NURSE PRACTITIONER

## 2025-03-24 RX ORDER — FOLLITROPIN 900 [IU]/1.08ML
INJECTION, SOLUTION SUBCUTANEOUS DAILY
COMMUNITY
Start: 2025-03-19

## 2025-03-24 RX ORDER — PROPYLTHIOURACIL 50 MG/1
50 TABLET ORAL 3 TIMES DAILY
COMMUNITY

## 2025-03-24 NOTE — PROGRESS NOTES
Preoperative Evaluation  [facility]  [address] [address]  Phone: [phone]  Fax: [phone]  Primary Provider: Physician No Ref-Primary  Pre-op Performing Provider: MIGUEL Loyd CNP  Mar 24, 2025             3/19/2025   Surgical Information   What procedure is being done? Ivf-egg retrieval   Facility or Hospital where procedure/surgery will be performed: [facility]   Who is doing the procedure / surgery? Dr Desir   Date of surgery / procedure: March 31   Time of surgery / procedure: Na   Where do you plan to recover after surgery? at home with family     Fax number for surgical facility: [phone]    Assessment & Plan     The proposed surgical procedure is considered INTERMEDIATE risk.    Preop general physical exam  Exam normal today.  Hemoglobin significantly improved since myomectomy. Patient is continuing to take iron.  No concerns on exam that would prevent or delay surgery.    - Hemoglobin w/Reflex to Iron Studies; Future  - Hemoglobin w/Reflex to Iron Studies    Hyperthyroidism  Graves disease  Managed by endocrine   switched to PTU 50 mg three times daily in January  Last TSH <0.10 , T4 1.2, T3 2.8            - No identified additional risk factors other than previously addressed    Preoperative Medication Instructions  Antiplatelet or Anticoagulation Medication Instructions   - We reviewed the medication list and the patient is not on an antiplatelet or anticoagulation medications.    Additional Medication Instructions  We reviewed the medication list and there are no chronic medications that need to be adjusted for this procedure.    Recommendation  Approval given to proceed with proposed procedure, without further diagnostic evaluation.      -   Ledy Collins is a 39 year old, presenting for the following:  Pre-Op Exam          3/24/2025     9:20 AM   Additional Questions   Roomed by HERI Alvarado     HPI: Patient having egg retrieval  procedure in about 1 week.          3/19/2025   Pre-Op Questionnaire   Have you ever had a heart attack or stroke? No   Have you ever had surgery on your heart or blood vessels, such as a stent placement, a coronary artery bypass, or surgery on an artery in your head, neck, heart, or legs? No   Do you have chest pain with activity? No   Do you have a history of heart failure? No   Do you currently have a cold, bronchitis or symptoms of other infection? No   Do you have a cough, shortness of breath, or wheezing? No   Do you or anyone in your family have previous history of blood clots? No   Do you or does anyone in your family have a serious bleeding problem such as prolonged bleeding following surgeries or cuts? No   Have you ever had problems with anemia or been told to take iron pills? (!) YES -patient taking iron once a day.    Have you had any abnormal blood loss such as black, tarry or bloody stools, or abnormal vaginal bleeding? No   Have you ever had a blood transfusion? No   Are you willing to have a blood transfusion if it is medically needed before, during, or after your surgery? Yes   Have you or any of your relatives ever had problems with anesthesia? No   Do you have sleep apnea, excessive snoring or daytime drowsiness? No   Do you have any artifical heart valves or other implanted medical devices like a pacemaker, defibrillator, or continuous glucose monitor? No   Do you have artificial joints? No   Are you allergic to latex? No     Health Care Directive  Patient does not have a Health Care Directive: Discussed advance care planning with patient; however, patient declined at this time.    Preoperative Review of    reviewed - controlled substances reflected in medication list.          Patient Active Problem List    Diagnosis Date Noted    S/P myomectomy 10/22/2024     Priority: Medium      Past Medical History:   Diagnosis Date    Motion sickness     NO ACTIVE PROBLEMS      Past Surgical History:  "  Procedure Laterality Date    DENTAL SURGERY  age 17    1 wisdom tooth    MYOMECTOMY N/A 10/22/2024    Procedure: ABDOMINAL MYOMECTOMY,;  Surgeon: Brandi Richardson MD;  Location: [facility] Main OR    SALPINGECTOMY Bilateral 10/22/2024    Procedure: BILATERAL SALPINGECTOMY;  Surgeon: Brandi Richardson MD;  Location: [facility] Main OR     Current Outpatient Medications   Medication Sig Dispense Refill    FOLLISTIM  UNT/1.08ML cartridge Inject subcutaneously daily.      methimazole (TAPAZOLE) 10 MG tablet Take 10 mg by mouth daily.         Allergies   Allergen Reactions    No Known Drug Allergy         Social History     Tobacco Use    Smoking status: Never     Passive exposure: Never    Smokeless tobacco: Not on file   Substance Use Topics    Alcohol use: No       History   Drug Use No               Objective    /70 (BP Location: Right arm, Patient Position: Sitting, Cuff Size: Adult Regular)   Pulse 93   Temp 98.5  F (36.9  C) (Oral)   Resp 16   Ht 1.549 m (5' 1\")   Wt 64.9 kg (143 lb)   LMP 03/19/2025 (Exact Date)   SpO2 98%   BMI 27.02 kg/m     Estimated body mass index is 27.02 kg/m  as calculated from the following:    Height as of this encounter: 1.549 m (5' 1\").    Weight as of this encounter: 64.9 kg (143 lb).  Physical Exam  GENERAL: alert and no distress  EYES: Eyes grossly normal to inspection, PERRL and conjunctivae and sclerae normal  HENT: ear canals and TM's normal, nose and mouth without ulcers or lesions  NECK: no adenopathy, no asymmetry, masses, or scars  RESP: lungs clear to auscultation - no rales, rhonchi or wheezes  CV: regular rate and rhythm, normal S1 S2, no S3 or S4, no murmur, click or rub, no peripheral edema  ABDOMEN: soft, nontender, no hepatosplenomegaly, no masses and bowel sounds normal  MS: no gross musculoskeletal defects noted, no edema  SKIN: no suspicious lesions or rashes  NEURO: Normal strength and tone, mentation intact and speech normal  PSYCH: " mentation appears normal, affect normal/bright    Recent Labs   Lab Test 10/23/24  0905 10/22/24  1234   HGB 8.7* 11.4*   PLT  --  298        Diagnostics  Recent Results (from the past 24 hours)   Hemoglobin with Reflex to Iron Studies    Collection Time: 03/24/25  9:48 AM   Result Value Ref Range    Hemoglobin 13.9 11.7 - 15.7 g/dL   Extra Green Top (Lithium Heparin) Tube    Collection Time: 03/24/25  9:48 AM   Result Value Ref Range    Hold Specimen JIC       No EKG required, no history of coronary heart disease, significant arrhythmia, peripheral arterial disease or other structural heart disease.    Revised Cardiac Risk Index (RCRI)  The patient has the following serious cardiovascular risks for perioperative complications:   - No serious cardiac risks = 0 points     RCRI Interpretation: 0 points: Class I (very low risk - 0.4% complication rate)         Signed Electronically by: MIGUEL Loyd CNP  A copy of this evaluation report is provided to the requesting physician.

## 2025-03-24 NOTE — PATIENT INSTRUCTIONS
How to Take Your Medication Before Surgery  Preoperative Medication Instructions   Antiplatelet or Anticoagulation Medication Instructions   - We reviewed the medication list and the patient is not on an antiplatelet or anticoagulation medications.    Additional Medication Instructions  We reviewed the medication list and there are no chronic medications that need to be adjusted for this procedure.       Patient Education   Preparing for Your Surgery  For Adults  Getting started  In most cases, a nurse will call to review your health history and instructions. They will give you an arrival time based on your scheduled surgery time. Please be ready to share:  Your doctor's clinic name and phone number  Your medical, surgical, and anesthesia history  A list of allergies and sensitivities  A list of medicines, including herbal treatments and over-the-counter drugs  Whether the patient has a legal guardian (ask how to send us the papers in advance)  Note: You may not receive a call if you were seen at our PAC (Preoperative Assessment Center).  Please tell us if you're pregnant--or if there's any chance you might be pregnant. Some surgeries may injure a fetus (unborn baby), so they require a pregnancy test. Surgeries that are safe for a fetus don't always need a test, and you can choose whether to have one.   Preparing for surgery  Within 10 to 30 days of surgery: Have a pre-op exam (sometimes called an H&P, or History and Physical). This can be done at a clinic or pre-operative center.  If you're having a , you may not need this exam. Talk to your care team.  At your pre-op exam, talk to your care team about all medicines you take. (This includes CBD oil and any drugs, such as THC, marijuana, and other forms of cannabis.) If you need to stop any medicine before surgery, ask when to start taking it again.  This is for your safety. Many medicines and drugs can make you bleed too much during surgery. Some change  how well surgery (anesthesia) drugs work.  Call your insurance company to let them know you're having surgery. (If you don't have insurance, call [phone].)  Call your clinic if there's any change in your health. This includes a scrape or scratch near the surgery site, or any signs of a cold (sore throat, runny nose, cough, rash, fever).  Eating and drinking guidelines  For your safety: Unless your surgeon tells you otherwise, follow the guidelines below.  Eat and drink as normal until 8 hours before you arrive for surgery. After that, no food or milk. You can spit out gum when you arrive.  Drink clear liquids until 2 hours before you arrive. These are liquids you can see through, like water, Gatorade, and Propel Water. They also include plain black coffee and tea (no cream or milk).  No alcohol for 24 hours before you arrive. The night before surgery, stop any drinks that contain THC.  If your care team tells you to take medicine on the morning of surgery, it's okay to take it with a sip of water. No other medicines or drugs are allowed (including CBD oil)--follow your care team's instructions.  If you have questions the day of surgery, call your hospital or surgery center.   Preventing infection  Shower or bathe the night before and the morning of surgery. Follow the instructions your clinic gave you. (If no instructions, use regular soap.)  Don't shave or clip hair near your surgery site. We'll remove the hair if needed.  Don't smoke or vape the morning of surgery. No chewing tobacco for 6 hours before you arrive. A nicotine patch is okay. You may spit out nicotine gum when you arrive.  For some surgeries, the surgeon will tell you to fully quit smoking and nicotine.  We will make every effort to keep you safe from infection. We will:  Clean our hands often with soap and water (or an alcohol-based hand rub).  Clean the skin at your surgery site with a special soap that kills germs.  Give you a special gown to  keep you warm. (Cold raises the risk of infection.)  Wear hair covers, masks, gowns, and gloves during surgery.  Give antibiotic medicine, if prescribed. Not all surgeries need this medicine.  What to bring on the day of surgery  Photo ID and insurance card  Copy of your health care directive, if you have one  Glasses and hearing aids (bring cases)  You can't wear contacts during surgery  Inhaler and eye drops, if you use them (tell us about these when you arrive)  CPAP machine or breathing device, if you use them  A few personal items, if spending the night  If you have . . .  A pacemaker, ICD (cardiac defibrillator), or other implant: Bring the ID card.  An implanted stimulator: Bring the remote control.  A legal guardian: Bring a copy of the certified (court-stamped) guardianship papers.  Please remove any jewelry, including body piercings. Leave jewelry and other valuables at home.  If you're going home the day of surgery  You must have a responsible adult drive you home. They should stay with you overnight as well.  If you don't have someone to stay with you, and you aren't safe to go home alone, we may keep you overnight. Insurance often won't pay for this.  After surgery  If it's hard to control your pain or you need more pain medicine, please call your surgeon's office.  Questions?   If you have any questions for your care team, list them here:   ____________________________________________________________________________________________________________________________________________________________________________________________________________________________________________________________  For informational purposes only. Not to replace the advice of your health care provider. Copyright   2003, 2019 [facility]. All rights reserved. Clinically reviewed by Judah Lee MD. [employer] 252656 - REV 08/24.

## 2025-04-20 ENCOUNTER — HEALTH MAINTENANCE LETTER (OUTPATIENT)
Age: 40
End: 2025-04-20

## 2025-05-22 ENCOUNTER — OFFICE VISIT (OUTPATIENT)
Dept: FAMILY MEDICINE | Facility: CLINIC | Age: 40
End: 2025-05-22
Payer: COMMERCIAL

## 2025-05-22 VITALS
SYSTOLIC BLOOD PRESSURE: 120 MMHG | WEIGHT: 149.5 LBS | HEART RATE: 92 BPM | RESPIRATION RATE: 16 BRPM | OXYGEN SATURATION: 99 % | HEIGHT: 62 IN | DIASTOLIC BLOOD PRESSURE: 80 MMHG | BODY MASS INDEX: 27.51 KG/M2 | TEMPERATURE: 98 F

## 2025-05-22 DIAGNOSIS — L50.9 HIVES: Primary | ICD-10-CM

## 2025-05-22 RX ORDER — ESTRADIOL 2 MG/1
TABLET ORAL
COMMUNITY
Start: 2025-05-13

## 2025-05-22 RX ORDER — LETROZOLE 2.5 MG/1
2 TABLET, FILM COATED ORAL
COMMUNITY
Start: 2025-05-01

## 2025-05-22 RX ORDER — DROSPIRENONE AND ETHINYL ESTRADIOL 0.03MG-3MG
KIT ORAL
COMMUNITY
Start: 2025-05-12

## 2025-05-22 RX ORDER — TRIAMCINOLONE ACETONIDE 1 MG/G
CREAM TOPICAL 2 TIMES DAILY
Qty: 80 G | Refills: 0 | Status: SHIPPED | OUTPATIENT
Start: 2025-05-22

## 2025-05-22 RX ORDER — LEUPROLIDE ACETATE 3.75 MG
KIT INTRAMUSCULAR
COMMUNITY
Start: 2025-05-08

## 2025-05-22 RX ORDER — DIAZEPAM 10 MG/1
TABLET ORAL
COMMUNITY
Start: 2025-05-13

## 2025-05-22 RX ORDER — NORETHINDRONE 5 MG/1
5 TABLET ORAL DAILY
COMMUNITY
Start: 2025-05-01

## 2025-05-22 RX ORDER — CETIRIZINE HYDROCHLORIDE 10 MG/1
10 TABLET ORAL 2 TIMES DAILY
COMMUNITY
Start: 2025-05-22

## 2025-05-22 RX ORDER — PROGESTERONE 50 MG/ML
INJECTION, SOLUTION INTRAMUSCULAR
COMMUNITY
Start: 2025-05-16

## 2025-05-22 NOTE — PROGRESS NOTES
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  I do not think this is medication related as the hives appeared before she started the medications.  I do recommend increasing the amount of Zyrtec.  I would take it at least twice a day.  COVID increase to 2 tablets twice a day if not noting improvement with 1 tablet twice a day.  Patient is currently also undergoing eye IVF.  She is not planning on doing implantation until about August.  Discussed I would recommend patient talk about dosing with OB if we do have ongoing hives.    Can apply triamcinolone cream to the itchiest areas.  Discussed I would recommend doing this for more than 2 weeks at a time.    Referral to allergy if hives persist.  Discussed there is not a ton they can do with idiopathic hives however if they are bothersome could consider doing immune modulator but would likely have to be after pregnancy.    - cetirizine (ZYRTEC) 10 MG tablet; Take 1 tablet (10 mg) by mouth 2 times daily.  - triamcinolone (KENALOG) 0.1 % external cream; Apply topically 2 times daily.  - Adult Allergy/Asthma  Referral; Future          BMI  Estimated body mass index is 27.79 kg/m  as calculated from the following:    Height as of this encounter: 1.562 m (5' 1.5\").    Weight as of this encounter: 67.8 kg (149 lb 8 oz).             Ledy Collins is a 40 year old, presenting for the following health issues:  Rash (Notice since Saturday started back now arms /Itching, red, minda burns )        5/22/2025     3:42 PM   Additional Questions   Roomed by WILDA TRUJILLO   Accompanied by Self     Rash  This is a new problem. The current episode started in the past 7 days. The problem occurs daily. The problem has been gradually worsening. Associated symptoms include a rash. The treatment provided no relief.   History of Present Illness       Reason for visit:  Rash on skin  Symptom onset:  3-7 days ago   She is taking medications regularly.    " "  Rash started 5/17/24-starting to spread more    Red raised itchy rash on back, arms and legs. Patient tried zyrtec-1 tablet a day.Patient didn't notice any improvement with that zyrtec.  Rash has been waxing and waning since it started.    Patient started two new medications-lupron depot injection and letrozole and norethindrone. (Patient started these after the rash first appeared). Patient is doing IVF.    No new food, body wash, lotion, detergents,    No recent exposure to plants    Patient denies increased stress.                    Objective    /80 (BP Location: Right arm, Patient Position: Sitting, Cuff Size: Adult Regular)   Pulse 92   Temp 98  F (36.7  C) (Oral)   Resp 16   Ht 1.562 m (5' 1.5\")   Wt 67.8 kg (149 lb 8 oz)   LMP 05/06/2025 (Exact Date)   SpO2 99%   BMI 27.79 kg/m    Body mass index is 27.79 kg/m .  Physical Exam  Constitutional:       Appearance: Normal appearance.   Skin:     Findings: Rash present. Rash is urticarial (back, arms, legs).   Neurological:      General: No focal deficit present.      Mental Status: She is alert and oriented to person, place, and time.   Psychiatric:         Mood and Affect: Mood normal.         Behavior: Behavior normal.                    Signed Electronically by: MIGUEL Guthrie CNP    "

## 2025-05-26 ENCOUNTER — PATIENT OUTREACH (OUTPATIENT)
Dept: CARE COORDINATION | Facility: CLINIC | Age: 40
End: 2025-05-26
Payer: COMMERCIAL

## (undated) DEVICE — SUTURE VICRYL+ 2-0 27IN SH UND VCP417H

## (undated) DEVICE — SOL NACL 0.9% IRRIG 1000ML BOTTLE 2F7124

## (undated) DEVICE — SUTURE VICRYL+ 0 36IN CT-1 UND VCP946H

## (undated) DEVICE — SU VICRYL+ 3-0 27IN SH UND VCP416H

## (undated) DEVICE — SPONGE LAP 18X18" X8435

## (undated) DEVICE — PACK MINOR SINGLE BASIN SSK3001

## (undated) DEVICE — GOWN LG DISP 9515

## (undated) DEVICE — SUCTION TIP YANKAUER W/O VENT K86

## (undated) DEVICE — CUSTOM PACK GEN MAJOR SBA5BGMHEA

## (undated) DEVICE — TUBE PENROSE 1/4 X 12 STRL 30414-025

## (undated) DEVICE — SU VICRYL+ 3-0 KS UNDYED VCP663H

## (undated) DEVICE — SU DERMABOND ADVANCED .7ML DNX12

## (undated) DEVICE — ELECTRODE PATIENT RETURN ADULT L10 FT 2 PLATE CORD 0855C

## (undated) DEVICE — DRAPE IOBAN 2 C-SECTION 3M 6697

## (undated) DEVICE — DRSG STERI STRIP 1/2X4" R1547

## (undated) DEVICE — GLOVE UNDER INDICATOR PI SZ 7.0 LF 41670

## (undated) DEVICE — SOL WATER IRRIG 1000ML BOTTLE 2F7114

## (undated) DEVICE — PREP DYNA-HEX 4% CHG SCRUB 4OZ BOTTLE MDS098710

## (undated) DEVICE — PAD SANITARY ADHES BACK 11IN NS 1380A

## (undated) DEVICE — NEEDLE HYPO 18X1-1/2 SAFETY 305918

## (undated) DEVICE — GLOVE BIOGEL PI ULTRATOUCH G SZ 7.0 42170

## (undated) DEVICE — PREP CHLORAPREP 26ML TINTED HI-LITE ORANGE 930815

## (undated) DEVICE — SYRINGE 10ML FILL SALINE FLUSH STERILE 306553

## (undated) DEVICE — SOL NACL 0.9% 100ML BAG 2B1302

## (undated) DEVICE — DECANTER BAG 2002S

## (undated) DEVICE — SU VICRYL+ 3-0 CT1 36IN UND VCP944H

## (undated) DEVICE — SUTURE VICRYL+ 0 27IN CT-1 UND VCP260H

## (undated) DEVICE — GLOVE BIOGEL PI ULTRATOUCH G SZ 6.5 42165

## (undated) DEVICE — Device

## (undated) DEVICE — SU VICRYL+ 0 8-18 CT1/CR UND VCP840D

## (undated) DEVICE — SUCTION MANIFOLD NEPTUNE 2 SYS 1 PORT 702-025-000

## (undated) DEVICE — ADH LIQUID MASTISOL TOPICAL VIAL 2-3ML 0523-48

## (undated) DEVICE — CATH TRAY FOLEY SURESTEP 16FR DRAIN BAG STATOCK A899916

## (undated) DEVICE — DRAPE OR LAPAROTOMY KC 89228*

## (undated) DEVICE — ESU LIGASURE ATLAS 20CM  LS1020

## (undated) DEVICE — DRSG FOAM MEPILEX BORDER SILVER 4X10 POSTOP 498450

## (undated) DEVICE — SURGICEL ABSORBABLE HEMOSTAT SNOW 4"X4" 2083

## (undated) DEVICE — SUTURE VICRYL+ 2-0 36IN CT-1 UND VCP945H

## (undated) DEVICE — GLOVE BIOGEL INDICATOR 7.5 LF 41675

## (undated) RX ORDER — PROPOFOL 10 MG/ML
INJECTION, EMULSION INTRAVENOUS
Status: DISPENSED
Start: 2024-10-22

## (undated) RX ORDER — DEXAMETHASONE SODIUM PHOSPHATE 10 MG/ML
INJECTION, EMULSION INTRAMUSCULAR; INTRAVENOUS
Status: DISPENSED
Start: 2024-10-22

## (undated) RX ORDER — TRANEXAMIC ACID 100 MG/ML
INJECTION, SOLUTION INTRAVENOUS
Status: DISPENSED
Start: 2024-10-22

## (undated) RX ORDER — ONDANSETRON 2 MG/ML
INJECTION INTRAMUSCULAR; INTRAVENOUS
Status: DISPENSED
Start: 2024-10-22

## (undated) RX ORDER — FENTANYL CITRATE 50 UG/ML
INJECTION, SOLUTION INTRAMUSCULAR; INTRAVENOUS
Status: DISPENSED
Start: 2024-10-22

## (undated) RX ORDER — LIDOCAINE HYDROCHLORIDE 10 MG/ML
INJECTION, SOLUTION EPIDURAL; INFILTRATION; INTRACAUDAL; PERINEURAL
Status: DISPENSED
Start: 2024-10-22